# Patient Record
Sex: FEMALE | Race: OTHER | NOT HISPANIC OR LATINO
[De-identification: names, ages, dates, MRNs, and addresses within clinical notes are randomized per-mention and may not be internally consistent; named-entity substitution may affect disease eponyms.]

---

## 2017-02-13 ENCOUNTER — APPOINTMENT (OUTPATIENT)
Dept: PEDIATRIC DEVELOPMENTAL SERVICES | Facility: CLINIC | Age: 22
End: 2017-02-13

## 2017-02-21 ENCOUNTER — APPOINTMENT (OUTPATIENT)
Dept: PEDIATRIC DEVELOPMENTAL SERVICES | Facility: CLINIC | Age: 22
End: 2017-02-21

## 2017-02-27 ENCOUNTER — APPOINTMENT (OUTPATIENT)
Dept: PEDIATRIC DEVELOPMENTAL SERVICES | Facility: CLINIC | Age: 22
End: 2017-02-27

## 2017-07-11 ENCOUNTER — APPOINTMENT (OUTPATIENT)
Dept: PEDIATRIC DEVELOPMENTAL SERVICES | Facility: CLINIC | Age: 22
End: 2017-07-11

## 2017-08-24 ENCOUNTER — EMERGENCY (EMERGENCY)
Facility: HOSPITAL | Age: 22
LOS: 1 days | Discharge: HOME | End: 2017-08-24
Admitting: EMERGENCY MEDICINE
Payer: COMMERCIAL

## 2017-08-24 VITALS
SYSTOLIC BLOOD PRESSURE: 122 MMHG | RESPIRATION RATE: 16 BRPM | HEART RATE: 97 BPM | OXYGEN SATURATION: 100 % | DIASTOLIC BLOOD PRESSURE: 82 MMHG | TEMPERATURE: 99 F

## 2017-08-24 PROCEDURE — 99284 EMERGENCY DEPT VISIT MOD MDM: CPT | Mod: 25

## 2017-08-24 PROCEDURE — 12001 RPR S/N/AX/GEN/TRNK 2.5CM/<: CPT

## 2017-08-24 PROCEDURE — 73620 X-RAY EXAM OF FOOT: CPT | Mod: 26,RT

## 2017-08-24 PROCEDURE — 73660 X-RAY EXAM OF TOE(S): CPT | Mod: 26,59,RT

## 2017-08-24 RX ORDER — ACETAMINOPHEN 500 MG
650 TABLET ORAL ONCE
Qty: 0 | Refills: 0 | Status: COMPLETED | OUTPATIENT
Start: 2017-08-24 | End: 2017-08-24

## 2017-08-24 RX ADMIN — Medication 650 MILLIGRAM(S): at 02:54

## 2017-08-24 NOTE — ED ADULT TRIAGE NOTE - CHIEF COMPLAINT QUOTE
Pt. accidently tripped and injured her L great toe on sidewalk.  Laceration. noted to toe. Skin warm + pedal pulse.

## 2017-08-24 NOTE — ED PROVIDER NOTE - OBJECTIVE STATEMENT
23 y/o F pt with no pmhx presents to Ed s/p trip and fall on the curb falling on her right foot getting a cut 5 hours ago. Pt deneis taking any pain medications. Pt denies numbness, tingling, weakness, n/v/f/c, dizziness, sob, cp.

## 2017-08-24 NOTE — ED PROVIDER NOTE - CARE PLAN
Principal Discharge DX:	Laceration of toe  Instructions for follow-up, activity and diet:	Rest, ice, elevate area.  Take Motrin 600mg every 8 hrs with food for pain.  Follow up with PMD within 48-72 hrs.  Any worsening pain, swelling, weakness, numbness return to ED. Ortho referral list provided if needed.

## 2018-02-05 ENCOUNTER — APPOINTMENT (OUTPATIENT)
Dept: PEDIATRIC DEVELOPMENTAL SERVICES | Facility: CLINIC | Age: 23
End: 2018-02-05
Payer: COMMERCIAL

## 2018-02-05 PROCEDURE — 90834 PSYTX W PT 45 MINUTES: CPT

## 2018-03-05 ENCOUNTER — APPOINTMENT (OUTPATIENT)
Dept: PEDIATRIC DEVELOPMENTAL SERVICES | Facility: CLINIC | Age: 23
End: 2018-03-05

## 2018-05-01 ENCOUNTER — EMERGENCY (EMERGENCY)
Facility: HOSPITAL | Age: 23
LOS: 1 days | Discharge: HOME | End: 2018-05-01
Attending: EMERGENCY MEDICINE
Payer: COMMERCIAL

## 2018-05-01 VITALS
RESPIRATION RATE: 16 BRPM | WEIGHT: 115.08 LBS | TEMPERATURE: 98 F | DIASTOLIC BLOOD PRESSURE: 88 MMHG | OXYGEN SATURATION: 99 % | HEART RATE: 60 BPM | SYSTOLIC BLOOD PRESSURE: 135 MMHG

## 2018-05-01 PROCEDURE — 99282 EMERGENCY DEPT VISIT SF MDM: CPT

## 2018-05-01 NOTE — ED ADULT NURSE NOTE - OBJECTIVE STATEMENT
22y/o female walked into ED a&ox3 c/o headache. Patient reports at around 11am she hit her head on her dresser draw, no LOC. States since incident she hs had headache. Reports history of headaches but unsure if this headache is related to injury. Currently c/o mild headache. No obvious signs of injury, unable to visualize laceration to head but patient feels pain to posterior right side. No bleeding noted. MD at bedside for eval. Safety and comfort maintained.

## 2018-05-01 NOTE — ED PROVIDER NOTE - ATTENDING CONTRIBUTION TO CARE
Sameera Curiel MD  23 yr old female Banged her head on a dresser, no LOC, no nausea, no vomiting, no LOC, now w/ HA, UTD immunizations; on exam, small ecchymosis with @ 1mm punctate laceration, neuro WNL. PLAN: no further work up.

## 2018-05-01 NOTE — ED PROVIDER NOTE - OBJECTIVE STATEMENT
24 y/o F presents after she struck her head on a dresser 22 y/o F presents after she struck her head on a dresser today at 11am, 24 y/o F presents after she struck her head on a dresser today at 11am, no LOC, no n/v, had minimal bleeding afterwards, presents b/c of HA. Not on blood thinners.

## 2018-05-14 ENCOUNTER — APPOINTMENT (OUTPATIENT)
Dept: PEDIATRIC DEVELOPMENTAL SERVICES | Facility: CLINIC | Age: 23
End: 2018-05-14
Payer: SELF-PAY

## 2018-05-14 PROCEDURE — 90834 PSYTX W PT 45 MINUTES: CPT

## 2020-01-28 ENCOUNTER — HOSPITAL ENCOUNTER (EMERGENCY)
Dept: HOSPITAL 93 - ER | Age: 25
LOS: 1 days | Discharge: HOME | End: 2020-01-29
Payer: COMMERCIAL

## 2020-01-28 VITALS — HEIGHT: 61 IN | WEIGHT: 105 LBS | BODY MASS INDEX: 19.83 KG/M2

## 2020-01-28 DIAGNOSIS — R07.89: ICD-10-CM

## 2020-01-28 DIAGNOSIS — E86.0: Primary | ICD-10-CM

## 2024-04-10 NOTE — ED ADULT NURSE NOTE - NSSISCREENINGQ4_ED_A_ED
Called patient and left a message for patient to call back when they can. Reviewed patient chart. Left contact my contact number [phone]        Time Spent This Encounter Total: 5  min medical record review  Monthly Minute Total including today: 5 minutes       No